# Patient Record
Sex: FEMALE | Race: AMERICAN INDIAN OR ALASKA NATIVE
[De-identification: names, ages, dates, MRNs, and addresses within clinical notes are randomized per-mention and may not be internally consistent; named-entity substitution may affect disease eponyms.]

---

## 2021-09-14 ENCOUNTER — HOSPITAL ENCOUNTER (EMERGENCY)
Dept: HOSPITAL 4 - SED | Age: 46
Discharge: HOME | End: 2021-09-14
Payer: MEDICAID

## 2021-09-14 VITALS — SYSTOLIC BLOOD PRESSURE: 141 MMHG

## 2021-09-14 VITALS — HEIGHT: 64 IN | BODY MASS INDEX: 29.53 KG/M2 | WEIGHT: 173 LBS

## 2021-09-14 DIAGNOSIS — L03.115: Primary | ICD-10-CM

## 2021-09-14 DIAGNOSIS — Z88.6: ICD-10-CM

## 2021-09-14 DIAGNOSIS — Z79.899: ICD-10-CM

## 2021-09-14 NOTE — NUR
Patient given written and verbal discharge instructions and verbalizes 
understanding.  ER MD discussed with patient the results and treatment 
provided. Patient in stable condition. ID arm band removed. Rx of Keflex and 
Ibuprofen given. Patient educated on pain management and to follow up with PMD. 
Pain Scale 0/10 Opportunity for questions provided and answered. Medication 
side effect fact sheet provided.

## 2021-09-14 NOTE — NUR
Pt BIB family to ED C/O  pain, redness and swelling to rt lower extremity since 
yesterday did not self medicate. VSS no s/s of acute distress Resting on gurney 
rails up

## 2022-01-01 ENCOUNTER — HOSPITAL ENCOUNTER (EMERGENCY)
Dept: HOSPITAL 4 - SED | Age: 47
Discharge: HOME | End: 2022-01-01
Payer: MEDICAID

## 2022-01-01 VITALS — BODY MASS INDEX: 25.16 KG/M2 | WEIGHT: 151 LBS | HEIGHT: 65 IN

## 2022-01-01 VITALS — SYSTOLIC BLOOD PRESSURE: 144 MMHG

## 2022-01-01 VITALS — SYSTOLIC BLOOD PRESSURE: 155 MMHG

## 2022-01-01 DIAGNOSIS — S93.401A: Primary | ICD-10-CM
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
## 2022-01-01 DIAGNOSIS — M79.671: ICD-10-CM

## 2022-01-01 NOTE — NUR
Patient given written and verbal discharge instructions and verbalizes 
understanding.  ER MD discussed with patient the results and treatment 
provided. Patient in stable condition. ID arm band removed.

Rx of norco given. Patient educated on pain management and to follow up with 
PMD. Pain Scale 5.

Opportunity for questions provided and answered. Medication side effect fact 
sheet provided. patient medicated as ordered.

## 2022-01-01 NOTE — NUR
PT STATES SHE WAS GOING DOWN A FLIGHT OF STAIRS AND MISSED THE LAST ONE. PT 
STATES RIGHT ANKLE PAIN WITH SWELLING AND DEFORMITY. STATES SHE IS ABLE TO PUT 
SMALL AMT OF WEIGHT ON FOOT/ANKLE.